# Patient Record
Sex: FEMALE | Race: AMERICAN INDIAN OR ALASKA NATIVE
[De-identification: names, ages, dates, MRNs, and addresses within clinical notes are randomized per-mention and may not be internally consistent; named-entity substitution may affect disease eponyms.]

---

## 2017-08-28 ENCOUNTER — HOSPITAL ENCOUNTER (EMERGENCY)
Dept: HOSPITAL 5 - ED | Age: 42
Discharge: HOME | End: 2017-08-28
Payer: SELF-PAY

## 2017-08-28 VITALS — SYSTOLIC BLOOD PRESSURE: 109 MMHG | DIASTOLIC BLOOD PRESSURE: 71 MMHG

## 2017-08-28 DIAGNOSIS — R10.9: ICD-10-CM

## 2017-08-28 DIAGNOSIS — F17.200: ICD-10-CM

## 2017-08-28 DIAGNOSIS — N83.292: Primary | ICD-10-CM

## 2017-08-28 DIAGNOSIS — D69.6: ICD-10-CM

## 2017-08-28 LAB
ALBUMIN SERPL-MCNC: 4.1 G/DL (ref 3.9–5)
ALBUMIN/GLOB SERPL: 1.3 %
ALP SERPL-CCNC: 61 UNITS/L (ref 35–129)
ALT SERPL-CCNC: 11 UNITS/L (ref 7–56)
ANION GAP SERPL CALC-SCNC: 16 MMOL/L
BASOPHILS NFR BLD AUTO: 0.8 % (ref 0–1.8)
BILIRUB SERPL-MCNC: 0.2 MG/DL (ref 0.1–1.2)
BILIRUB UR QL STRIP: (no result)
BLOOD UR QL VISUAL: (no result)
BUN SERPL-MCNC: 17 MG/DL (ref 7–17)
BUN/CREAT SERPL: 28.33 %
CALCIUM SERPL-MCNC: 8.6 MG/DL (ref 8.4–10.2)
CHLORIDE SERPL-SCNC: 98.5 MMOL/L (ref 98–107)
CO2 SERPL-SCNC: 25 MMOL/L (ref 22–30)
EOSINOPHIL NFR BLD AUTO: 0.4 % (ref 0–4.3)
GLUCOSE SERPL-MCNC: 75 MG/DL (ref 65–100)
HCT VFR BLD CALC: 41.6 % (ref 30.3–42.9)
HGB BLD-MCNC: 14 GM/DL (ref 10.1–14.3)
KETONES UR STRIP-MCNC: (no result) MG/DL
LEUKOCYTE ESTERASE UR QL STRIP: (no result)
LIPASE SERPL-CCNC: 22 UNITS/L (ref 13–60)
MCH RBC QN AUTO: 34 PG (ref 28–32)
MCHC RBC AUTO-ENTMCNC: 34 % (ref 30–34)
MCV RBC AUTO: 100 FL (ref 79–97)
MUCOUS THREADS #/AREA URNS HPF: (no result) /HPF
NITRITE UR QL STRIP: (no result)
PH UR STRIP: 5 [PH] (ref 5–7)
PLATELET # BLD: 132 K/MM3 (ref 140–440)
POTASSIUM SERPL-SCNC: 3.8 MMOL/L (ref 3.6–5)
PROT SERPL-MCNC: 7.2 G/DL (ref 6.3–8.2)
PROT UR STRIP-MCNC: (no result) MG/DL
RBC # BLD AUTO: 4.16 M/MM3 (ref 3.65–5.03)
RBC #/AREA URNS HPF: 5 /HPF (ref 0–6)
SODIUM SERPL-SCNC: 136 MMOL/L (ref 137–145)
UROBILINOGEN UR-MCNC: < 2 MG/DL (ref ?–2)
WBC # BLD AUTO: 4.5 K/MM3 (ref 4.5–11)
WBC #/AREA URNS HPF: 1 /HPF (ref 0–6)

## 2017-08-28 PROCEDURE — 36415 COLL VENOUS BLD VENIPUNCTURE: CPT

## 2017-08-28 PROCEDURE — 83690 ASSAY OF LIPASE: CPT

## 2017-08-28 PROCEDURE — 81001 URINALYSIS AUTO W/SCOPE: CPT

## 2017-08-28 PROCEDURE — 84703 CHORIONIC GONADOTROPIN ASSAY: CPT

## 2017-08-28 PROCEDURE — 74176 CT ABD & PELVIS W/O CONTRAST: CPT

## 2017-08-28 PROCEDURE — 85025 COMPLETE CBC W/AUTO DIFF WBC: CPT

## 2017-08-28 PROCEDURE — 80053 COMPREHEN METABOLIC PANEL: CPT

## 2017-08-28 NOTE — EMERGENCY DEPARTMENT REPORT
Stated Complaint: STAPH INFECTION


Time Seen by Provider: 08/28/17 15:08





- HPI


History of Present Illness: 





Pt c/o staph infections since Thursday.  Pt states she is also having abd pain. 





- ROS


Review of Systems: 





+ vomiting on Thursday 





- Exam


Physical Exam: 





pt looks well, non toxic


steady gait


no abscesses noted to abd 


MSE screening note: 


Focused history and physical exam performed.


Due to findings the following was ordered:





labs 





ED Disposition for MSE


Condition: Stable

## 2017-08-28 NOTE — CAT SCAN REPORT
FINAL REPORT



EXAM:  CT ABDOMEN PELVIS WO CON



HISTORY:  right sided, suprapubic and LUQ Abd pain 



TECHNIQUE:  Helical CT scan through the abdomen and pelvis

without contrast. Images are reconstructed in the sagittal and

coronal planes.



PRIORS:  None.



FINDINGS:  

Solid organ and bowel evaluation is limited without intravenous

contrast. Bowel evaluation is limited without oral contrast.



The lung bases are clear.



The liver, gallbladder, pancreas, spleen and adrenal glands

appear normal.



The kidneys appear grossly normal. 



The uterus and right ovary appear normal. There is a 4.4 x 3.3 x

3.4 Cm cyst in the left ovary. 



The stomach appears grossly within normal limits.



There are no abnormally dilated loops of bowel or acute

inflammatory changes. A normal-appearing appendix is identified.



The abdominal aorta has a normal diameter.



The lower thoracic and lumbar vertebrae are normal in height and

alignment. There is degenerative disc disease at L2-3. 



IMPRESSION:  

1. 4.4 cm left ovarian cyst. Further evaluation with ultrasound

is recommended. 



2. Otherwise, no acute findings in the abdomen/pelvis.

## 2017-08-28 NOTE — EMERGENCY DEPARTMENT REPORT
HPI





- General


Chief Complaint: Abdominal Pain


Time Seen by Provider: 17 15:08





- HPI


HPI: 





Room 38





The patient is a 41-year-old female present with the chief complaint of "bumps" 

and abdominal pain.  The patient states over the past 5 days she has noticed 

bumps coming up on her skin of the left wrist and right thigh.  Patient states 

she developed abdominal pain 5 days ago as well.  Patient claims pain in the 

left quadrant and suprapubic regions.  Patient denies dysuria or hematuria.  

Patient describes pain as constant in nature and gives a score of 8/10.  

Patient denies being on any new medications.  The patient drove herself to the 

emergency department and there are no visitors present





ED Past Medical Hx





- Past Medical History


Previous Medical History?: No





- Surgical History


Past Surgical History?: Yes


Additional Surgical History: 





- Family History


Family history: no significant





- Social History


Smoking Status: Light Tobacco Smoker


Substance Use Type: None (denies illicit drug use), Alcohol (occasional)





- Medications


Home Medications: 


 Home Medications











 Medication  Instructions  Recorded  Confirmed  Last Taken  Type


 


HYDROcodone/APAP 5-325 [[place] 1 - 2 each PO Q6HR PRN #10 tablet 17  

Unknown Rx





5/325]     














ED Review of Systems


ROS: 


Stated complaint: STAPH INFECTION


Other details as noted in HPI





Comment: All other systems reviewed and negative


Constitutional: denies: chills, fever


Eyes: denies: eye pain, eye discharge, vision change


ENT: denies: ear pain, throat pain


Respiratory: denies: cough, shortness of breath, wheezing


Cardiovascular: denies: chest pain, palpitations


Endocrine: no symptoms reported


Gastrointestinal: abdominal pain


Genitourinary: denies: dysuria, hematuria


Musculoskeletal: denies: back pain, joint swelling, arthralgia


Skin: lesions


Neurological: denies: headache, weakness, paresthesias


Psychiatric: denies: anxiety, depression


Hematological/Lymphatic: denies: easy bleeding, easy bruising





Physical Exam





- Physical Exam


Vital Signs: 


 Vital Signs











  17





  15:09 15:50 16:29


 


Temperature 99.1 F  


 


Pulse Rate 65  63


 


Respiratory 16 18 18





Rate   


 


Blood Pressure 132/75  


 


Blood Pressure   109/71





[Left]   


 


O2 Sat by Pulse 100  100





Oximetry   











Physical Exam: 





GENERAL: The patient is well-developed well-nourished female standing in room 

not appear to be in acute distress


HEENT: Normocephalic.  Atraumatic.  Extraocular motions are intact.  Patient 

has moist mucous membranes.


NECK: Supple.  Trachea midline


CHEST/LUNGS: Clear to auscultation.  There is no respiratory distress noted.


HEART/CARDIOVASCULAR: Regular.  There is no tachycardia.  There is no gallop 

rub or murmur.


ABDOMEN: Abdomen is soft, with discomfort to palpation in the suprapubic and 

left abdomen.  Patient has normal bowel sounds.  There is no abdominal 

distention.


SKIN: There is a small bump approximately 2 mm in diameter on the left wrist 

there is no overlying erythema.  There is no diaphoresis.


NEURO: The patient is awake, alert, and oriented.  The patient is cooperative. 

The patient has normal speech and gait.


MUSCULOSKELETAL: There is no evidence of acute injury.





ED Course


 Vital Signs











  17





  15:09 15:50 16:29


 


Temperature 99.1 F  


 


Pulse Rate 65  63


 


Respiratory 16 18 18





Rate   


 


Blood Pressure 132/75  


 


Blood Pressure   109/71





[Left]   


 


O2 Sat by Pulse 100  100





Oximetry   














ED Medical Decision Making





- Lab Data


Result diagrams: 


 17 15:38





 17 15:38





 Laboratory Tests











  17





  15:38 15:38 15:38


 


WBC  4.5  


 


RBC  4.16  


 


Hgb  14.0  


 


Hct  41.6  


 


MCV  100 H  


 


MCH  34 H  


 


MCHC  34  


 


RDW  13.7  


 


Plt Count  132 L  


 


Lymph % (Auto)  40.5 H  


 


Mono % (Auto)  7.9 H  


 


Eos % (Auto)  0.4  


 


Baso % (Auto)  0.8  


 


Lymph #  1.8  


 


Mono #  0.4  


 


Eos #  0.0  


 


Baso #  0.0  


 


Seg Neutrophils %  50.4  


 


Seg Neutrophils #  2.3  


 


Sodium   136 L 


 


Potassium   3.8 


 


Chloride   98.5 


 


Carbon Dioxide   25 


 


Anion Gap   16 


 


BUN   17 


 


Creatinine   0.6 L 


 


Estimated GFR   > 60 


 


BUN/Creatinine Ratio   28.33 


 


Glucose   75 


 


Calcium   8.6 


 


Total Bilirubin   0.20 


 


AST   22 


 


ALT   11 


 


Alkaline Phosphatase   61 


 


Total Protein   7.2 


 


Albumin   4.1 


 


Albumin/Globulin Ratio   1.3 


 


Lipase   22 


 


HCG, Qual    Negative








 Laboratory Tests











  17





  15:38 15:38 15:38


 


WBC  4.5  


 


RBC  4.16  


 


Hgb  14.0  


 


Hct  41.6  


 


MCV  100 H  


 


MCH  34 H  


 


MCHC  34  


 


RDW  13.7  


 


Plt Count  132 L  


 


Lymph % (Auto)  40.5 H  


 


Mono % (Auto)  7.9 H  


 


Eos % (Auto)  0.4  


 


Baso % (Auto)  0.8  


 


Lymph #  1.8  


 


Mono #  0.4  


 


Eos #  0.0  


 


Baso #  0.0  


 


Seg Neutrophils %  50.4  


 


Seg Neutrophils #  2.3  


 


Sodium   136 L 


 


Potassium   3.8 


 


Chloride   98.5 


 


Carbon Dioxide   25 


 


Anion Gap   16 


 


BUN   17 


 


Creatinine   0.6 L 


 


Estimated GFR   > 60 


 


BUN/Creatinine Ratio   28.33 


 


Glucose   75 


 


Calcium   8.6 


 


Total Bilirubin   0.20 


 


AST   22 


 


ALT   11 


 


Alkaline Phosphatase   61 


 


Total Protein   7.2 


 


Albumin   4.1 


 


Albumin/Globulin Ratio   1.3 


 


Lipase   22 


 


HCG, Qual    Negative


 


Urine Color   


 


Urine Turbidity   


 


Urine pH   


 


Ur Specific Gravity   


 


Urine Protein   


 


Urine Glucose (UA)   


 


Urine Ketones   


 


Urine Blood   


 


Urine Nitrite   


 


Urine Bilirubin   


 


Urine Urobilinogen   


 


Ur Leukocyte Esterase   


 


Urine WBC (Auto)   


 


Urine RBC (Auto)   


 


U Epithel Cells (Auto)   


 


Urine Mucus   














  17





  20:36


 


WBC 


 


RBC 


 


Hgb 


 


Hct 


 


MCV 


 


MCH 


 


MCHC 


 


RDW 


 


Plt Count 


 


Lymph % (Auto) 


 


Mono % (Auto) 


 


Eos % (Auto) 


 


Baso % (Auto) 


 


Lymph # 


 


Mono # 


 


Eos # 


 


Baso # 


 


Seg Neutrophils % 


 


Seg Neutrophils # 


 


Sodium 


 


Potassium 


 


Chloride 


 


Carbon Dioxide 


 


Anion Gap 


 


BUN 


 


Creatinine 


 


Estimated GFR 


 


BUN/Creatinine Ratio 


 


Glucose 


 


Calcium 


 


Total Bilirubin 


 


AST 


 


ALT 


 


Alkaline Phosphatase 


 


Total Protein 


 


Albumin 


 


Albumin/Globulin Ratio 


 


Lipase 


 


HCG, Qual 


 


Urine Color  Yellow


 


Urine Turbidity  Clear


 


Urine pH  5.0


 


Ur Specific Gravity  1.027


 


Urine Protein  <15 mg/dl


 


Urine Glucose (UA)  Neg


 


Urine Ketones  Tr


 


Urine Blood  Neg


 


Urine Nitrite  Neg


 


Urine Bilirubin  Neg


 


Urine Urobilinogen  < 2.0


 


Ur Leukocyte Esterase  Neg


 


Urine WBC (Auto)  1.0


 


Urine RBC (Auto)  5.0


 


U Epithel Cells (Auto)  4.0


 


Urine Mucus  2+














- Radiology Data


Radiology results: report reviewed (CT abdomen and pelvis), image reviewed (CT 

abdomen and pelvis)





CT abdomen and pelvis (read by radiologist)-4.4 cm left ovarian cyst.  Further 

evaluation with ultrasound is recommended.  Otherwise no acute findings in the 

abdomen/pelvis





- Differential Diagnosis


allergic reaction, insect bite, diverticulitis


Critical care attestation.: 


If time is entered above; I have spent that time in minutes in the direct care 

of this critically ill patient, excluding procedure time.








ED Disposition


Clinical Impression: 


 Abdominal pain, Thrombocytopenia, Ovarian cyst





Disposition:  TO HOME OR SELFCARE


Is pt being admited?: No


Does the pt Need Aspirin: No


Condition: Undetermined


Instructions:  Abdominal Pain (ED)


Prescriptions: 


HYDROcodone/APAP 5-325 [[place] 5/325] 1 - 2 each PO Q6HR PRN #10 tablet


 PRN Reason: Pain


Referrals: 


SHAUN JACKSON MD [Staff Physician] - 3-5 Days (Dr. Jackson is a hematologist.  

His follow up with them for further evaluation of your thrombocytopenia (low 

platelet count))


VERONIQUE RING MD [Staff Physician] - 3-5 Days (Dr. Ring is a primary 

physician.  Please follow up with him to be established as a patient)


[facility] [Provider Group] - 3-5 Days


Time of Disposition: 19:52 (patient eloped)

## 2020-01-10 ENCOUNTER — HOSPITAL ENCOUNTER (EMERGENCY)
Dept: HOSPITAL 5 - ED | Age: 45
LOS: 1 days | Discharge: HOME | End: 2020-01-11
Payer: SELF-PAY

## 2020-01-10 DIAGNOSIS — Z79.899: ICD-10-CM

## 2020-01-10 DIAGNOSIS — K05.00: Primary | ICD-10-CM

## 2020-01-10 DIAGNOSIS — K02.9: ICD-10-CM

## 2020-01-10 DIAGNOSIS — K04.7: ICD-10-CM

## 2020-01-10 PROCEDURE — 99282 EMERGENCY DEPT VISIT SF MDM: CPT

## 2020-01-11 VITALS — SYSTOLIC BLOOD PRESSURE: 106 MMHG | DIASTOLIC BLOOD PRESSURE: 51 MMHG

## 2020-01-11 NOTE — EMERGENCY DEPARTMENT REPORT
ED General Adult HPI





- General


Chief complaint: Dental/Oral


Stated complaint: ABSCESS LEFT JAW


Source: patient, family


Mode of arrival: Ambulatory


Limitations: No Limitations





- History of Present Illness


Initial comments: 





Patient is a 44-year-old American female with no past medical history who 

presents to the ED with complaints of acute onset persistent severe swollen left

mandibular gum, severely painful left mandibular premolar and molar toothache 

for the last 2 days worse in the last 12 hours.  Patient denies fever, chills, 

sore throat, headache, chest pain, shortness of breath, traumatic injury, 

dizziness, nausea and vomiting or cough.  Patient states that she has been 

taking over-the-counter medications with no relief.


MD Complaint: left mandibular swelling; dental abscess and pain


-: Sudden, days(s) (2)


Location: mouth


Radiation: non-radiation


Severity scale (0 -10): 10


Quality: aching, sharp


Consistency: constant


Improves with: none


Worsens with: eating


Associated Symptoms: denies other symptoms, loss of appetite.  denies: 

confusion, chest pain, cough, diaphoresis, fever/chills, headaches, malaise, 

nausea/vomiting, rash, shortness of breath, weakness, other


Treatments Prior to Arrival: NSAID





- Related Data


                                  Previous Rx's











 Medication  Instructions  Recorded  Last Taken  Type


 


HYDROcodone/APAP 5-325 [[place] 1 - 2 each PO Q6HR PRN #10 tablet 17 Unknown

 Rx





5/325]    


 


Acetaminophen/Codeine [Tylenol 1 tab PO Q6H PRN #12 tab 20 Unknown Rx





/Codeine # 3 tab]    


 


Clindamycin [Clindamycin CAP] 300 mg PO Q8HR #60 capsule 20 Unknown Rx


 


Ketorolac [Toradol] 10 mg PO Q8H PRN #20 tablet 20 Unknown Rx











                                    Allergies











Allergy/AdvReac Type Severity Reaction Status Date / Time


 


No Known Allergies Allergy   Unverified 17 16:02














ED Review of Systems


ROS: 


Stated complaint: ABSCESS LEFT JAW


Other details as noted in HPI





Constitutional: denies: chills, fever


Eyes: denies: eye pain, eye discharge, vision change


ENT: dental pain (left mandibular premolar and molar toothache; swollen gum).  

denies: ear pain, throat pain


Respiratory: denies: cough, shortness of breath, wheezing


Cardiovascular: denies: chest pain, palpitations


Endocrine: no symptoms reported


Gastrointestinal: denies: abdominal pain, nausea, diarrhea


Genitourinary: denies: urgency, dysuria, discharge


Musculoskeletal: denies: back pain, joint swelling, arthralgia


Skin: denies: rash, lesions


Neurological: denies: headache, weakness, paresthesias


Psychiatric: denies: anxiety, depression


Hematological/Lymphatic: denies: easy bleeding, easy bruising





ED Past Medical Hx





- Past Medical History


Previous Medical History?: No





- Surgical History


Past Surgical History?: Yes


Additional Surgical History: 





- Social History


Smoking Status: Never Smoker


Substance Use Type: None





- Medications


Home Medications: 


                                Home Medications











 Medication  Instructions  Recorded  Confirmed  Last Taken  Type


 


HYDROcodone/APAP 5-325 [[place] 1 - 2 each PO Q6HR PRN #10 tablet 17  

Unknown Rx





5/325]     


 


Acetaminophen/Codeine [Tylenol 1 tab PO Q6H PRN #12 tab 20  Unknown Rx





/Codeine # 3 tab]     


 


Clindamycin [Clindamycin CAP] 300 mg PO Q8HR #60 capsule 20  Unknown Rx


 


Ketorolac [Toradol] 10 mg PO Q8H PRN #20 tablet 20  Unknown Rx














ED Physical Exam





- General


Limitations: No Limitations


General appearance: alert, in no apparent distress





- Head


Head exam: Present: atraumatic, normocephalic





- Eye


Eye exam: Present: normal appearance, PERRL, EOMI


Pupils: Present: normal accommodation





- ENT


ENT exam: Present: mucous membranes moist, TM's normal bilaterally, normal 

external ear exam, other (swollen, severely tender left mandibular gingiva and 

severely tender left mandibular premolar and molar teeth)





- Neck


Neck exam: Present: normal inspection, full ROM, lymphadenopathy





- Respiratory


Respiratory exam: Present: normal lung sounds bilaterally.  Absent: respiratory 

distress, wheezes, rales, chest wall tenderness, accessory muscle use, prolonged

 expiratory





- Cardiovascular


Cardiovascular Exam: Present: regular rate, normal rhythm, normal heart sounds. 

 Absent: systolic murmur, diastolic murmur, rubs, gallop





- GI/Abdominal


GI/Abdominal exam: Present: soft, normal bowel sounds.  Absent: tenderness, 

guarding, rebound, hyperactive bowel sounds, hypoactive bowel sounds





- Extremities Exam


Extremities exam: Present: normal inspection, full ROM, normal capillary refill





- Back Exam


Back exam: Present: normal inspection, full ROM.  Absent: muscle spasm, 

paraspinal tenderness





- Neurological Exam


Neurological exam: Present: alert, oriented X3, CN II-XII intact, normal gait, 

reflexes normal





- Psychiatric


Psychiatric exam: Present: normal affect, normal mood





- Skin


Skin exam: Present: warm, dry, intact, normal color.  Absent: rash





ED Course





                                   Vital Signs











  01/10/20





  23:46


 


Temperature 98.6 F


 


Pulse Rate 66


 


Respiratory 20





Rate 


 


Blood Pressure 122/76


 


O2 Sat by Pulse 98





Oximetry 














ED Medical Decision Making





- Medical Decision Making





This is a 44-year-old female who presented to the ED with painful left 

mandibular premolar and molar toothache with swollen left mandibular gingiva for

 2 days.  In the ED, patient is alert and oriented 3 and is not in any distress

 but appears to be in pain.  Patient was treated for pain in the ED and given 

initial oral antibiotics in the ED.  Patient was discharged home on pain 

medications and antibiotics and advised to follow up with [facility] dental 

clinic for further evaluation.  Patient was also advised to return to the ED 

immediately if symptoms get worse.





- Differential Diagnosis


dental abscess; dental caries; acute gingivitis


Critical care attestation.: 


If time is entered above; I have spent that time in minutes in the direct care 

of this critically ill patient, excluding procedure time.








ED Disposition


Clinical Impression: 


 Dental abscess, Acute gingivitis, Dental caries





Disposition:  TO HOME OR SELFCARE


Is pt being admited?: No


Does the pt Need Aspirin: No


Condition: Stable


Instructions:  Dental Abscess (ED), Gingivitis (ED), Dental Caries (ED)


Additional Instructions: 


Take medications with food, drink plenty of fluids and follow-up with the [facility] dental clinic in 7-10 days for reevaluation.  Return to the ED 

immediately if symptoms get worse.


Prescriptions: 


Clindamycin [Clindamycin CAP] 300 mg PO Q8HR #60 capsule


Ketorolac [Toradol] 10 mg PO Q8H PRN #20 tablet


 PRN Reason: Pain


Acetaminophen/Codeine [Tylenol /Codeine # 3 tab] 1 tab PO Q6H PRN #12 tab


 PRN Reason: Pain , Severe (7-10)


Referrals: 


[facility] [Outside] - 7-10 days


Forms:  Work/School Release Form(ED)


Time of Disposition: 03:30


Print Language: ENGLISH